# Patient Record
Sex: MALE | Race: WHITE
[De-identification: names, ages, dates, MRNs, and addresses within clinical notes are randomized per-mention and may not be internally consistent; named-entity substitution may affect disease eponyms.]

---

## 2019-01-22 ENCOUNTER — HOSPITAL ENCOUNTER (OUTPATIENT)
Dept: HOSPITAL 84 - D.PAN | Age: 29
Discharge: HOME | End: 2019-01-22
Attending: SURGERY
Payer: COMMERCIAL

## 2019-01-22 VITALS — WEIGHT: 269 LBS | HEIGHT: 68 IN | BODY MASS INDEX: 40.77 KG/M2

## 2019-01-22 VITALS — DIASTOLIC BLOOD PRESSURE: 71 MMHG | SYSTOLIC BLOOD PRESSURE: 133 MMHG

## 2019-01-22 DIAGNOSIS — K64.8: ICD-10-CM

## 2019-01-22 DIAGNOSIS — K62.89: Primary | ICD-10-CM

## 2019-01-22 DIAGNOSIS — N20.0: ICD-10-CM

## 2019-01-22 LAB
ANION GAP SERPL CALC-SCNC: 12.6 MMOL/L (ref 8–16)
BASOPHILS NFR BLD AUTO: 0.5 % (ref 0–2)
BUN SERPL-MCNC: 13 MG/DL (ref 7–18)
CALCIUM SERPL-MCNC: 8.3 MG/DL (ref 8.5–10.1)
CHLORIDE SERPL-SCNC: 105 MMOL/L (ref 98–107)
CO2 SERPL-SCNC: 27.3 MMOL/L (ref 21–32)
CREAT SERPL-MCNC: 0.9 MG/DL (ref 0.6–1.3)
EOSINOPHIL NFR BLD: 3.7 % (ref 0–7)
ERYTHROCYTE [DISTWIDTH] IN BLOOD BY AUTOMATED COUNT: 13.1 % (ref 11.5–14.5)
GLUCOSE SERPL-MCNC: 102 MG/DL (ref 74–106)
HCT VFR BLD CALC: 45.9 % (ref 42–54)
HGB BLD-MCNC: 15.2 G/DL (ref 13.5–17.5)
IMM GRANULOCYTES NFR BLD: 0.7 % (ref 0–5)
LYMPHOCYTES NFR BLD AUTO: 29.1 % (ref 15–50)
MCH RBC QN AUTO: 27.7 PG (ref 26–34)
MCHC RBC AUTO-ENTMCNC: 33.1 G/DL (ref 31–37)
MCV RBC: 83.8 FL (ref 80–100)
MONOCYTES NFR BLD: 5.9 % (ref 2–11)
NEUTROPHILS NFR BLD AUTO: 60.1 % (ref 40–80)
OSMOLALITY SERPL CALC.SUM OF ELEC: 280 MOSM/KG (ref 275–300)
PLATELET # BLD: 281 10X3/UL (ref 130–400)
PMV BLD AUTO: 10.4 FL (ref 7.4–10.4)
POTASSIUM SERPL-SCNC: 3.9 MMOL/L (ref 3.5–5.1)
RBC # BLD AUTO: 5.48 10X6/UL (ref 4.2–6.1)
SODIUM SERPL-SCNC: 141 MMOL/L (ref 136–145)
WBC # BLD AUTO: 8.2 10X3/UL (ref 4.8–10.8)

## 2019-01-24 NOTE — OP
PATIENT NAME:  CLARE ALFORD                             MEDICAL RECORD: [id number]
:86                                             LOCATION:D.PAN          
                                                         ADMISSION DATE:        
SURGEON:  SHERMAN MCINTOSH MD          
 
 
DATE OF OPERATION:  2019
 
PREOPERATIVE DIAGNOSES:
1.  Anal pain.
2.  Kidney stones.
3.  Arthritis.
 
POSTOPERATIVE DIAGNOSES:
1.  External hemorrhoids.
2.  Arthritis.
3.  Kidney stones.
 
PROCEDURE:
1.  Anal exam under anesthesia.
2.  Right posterior column hemorrhoidectomy.
 
SURGEON:  Sherman Mcintosh MD
 
REPORT OF PROCEDURE:  The patient's perianal region was prepped and draped in
sterile fashion.  An anoscope was inserted and a 360 degree inspection was
performed.  Internally, there was no evidence of internal hemorrhoids, but
externally, there was a large mass of vascular tissue present at the 7 o'clock
position.  There was no sign of any inflammatory changes.  There is no sign of
fistula or fissure visible.  The patient also had some smaller external
hemorrhoids present and a larger collection of external hemorrhoid present
posteriorly towards the tip of the coccyx.  The decision was made to just
perform a hemorrhoidectomy of the right posterior hemorrhoid column.  A 2-0
chromic was placed at the base of the rectum and the hemorrhoid was completely
excised overlying the muscle tissue.  The  damian was treated with electrocautery
to stop any bleeding that was found.  The sphincteric muscles were visible and
noted to be completely intact.  We irrigated out the wound with normal saline. 
The suture that had been placed at the base of the rectum was then run in a
locking fashion out towards the anoderm.  At this point, we had complete closure
of the hemorrhoidal tissue.  The wound was irrigated one last time and care was
taken to assure there was no sign of any bleeding.  The patient had no other
hemorrhoids, which needed any treatment.  At this point, the anoscope was
completely removed.  A 5 cc of 0.25% Marcaine with epinephrine was infused into
the surrounding tissues and a piece of Gelfoam dipped in Americaine was placed
in the anus.
 
COMPLICATIONS:  None.
 
CONDITION:  Stable.
 
ANESTHESIA:  General endotracheal and local.
 
BLOOD LOSS:  30 mL.
 
TRANSINT:[id number] Voice Confirmation ID: [id number] DOCUMENT ID: [id number]
 
 
 
OPERATIVE REPORT                               [id number]    CLARE ALFORD CHRISTIAN MD          
 
 
 
Electronically Signed by SHERMAN MCINTOSH on 19 at 1044
 
 
 
 
 
 
 
 
 
 
 
 
 
 
 
 
 
 
 
 
 
 
 
 
 
 
 
 
 
 
 
 
 
 
 
 
 
 
 
 
 
CC: JORGE GUTIERREZ                                              [id number]
DICTATION DATE: 19 0957     :     19 1109      [facility] 
                                                                      [id number] [place] [address]